# Patient Record
Sex: MALE | HISPANIC OR LATINO
[De-identification: names, ages, dates, MRNs, and addresses within clinical notes are randomized per-mention and may not be internally consistent; named-entity substitution may affect disease eponyms.]

---

## 2024-09-17 PROBLEM — Z00.00 ENCOUNTER FOR PREVENTIVE HEALTH EXAMINATION: Status: ACTIVE | Noted: 2024-09-17

## 2024-09-26 ENCOUNTER — APPOINTMENT (OUTPATIENT)
Dept: NEUROSURGERY | Facility: CLINIC | Age: 79
End: 2024-09-26
Payer: MEDICARE

## 2024-09-26 ENCOUNTER — NON-APPOINTMENT (OUTPATIENT)
Age: 79
End: 2024-09-26

## 2024-09-26 VITALS
BODY MASS INDEX: 25.69 KG/M2 | HEART RATE: 76 BPM | OXYGEN SATURATION: 95 % | HEIGHT: 63 IN | SYSTOLIC BLOOD PRESSURE: 152 MMHG | DIASTOLIC BLOOD PRESSURE: 85 MMHG | WEIGHT: 145 LBS

## 2024-09-26 DIAGNOSIS — Z86.39 PERSONAL HISTORY OF OTHER ENDOCRINE, NUTRITIONAL AND METABOLIC DISEASE: ICD-10-CM

## 2024-09-26 DIAGNOSIS — G25.0 ESSENTIAL TREMOR: ICD-10-CM

## 2024-09-26 DIAGNOSIS — Z78.9 OTHER SPECIFIED HEALTH STATUS: ICD-10-CM

## 2024-09-26 DIAGNOSIS — R25.1 TREMOR, UNSPECIFIED: ICD-10-CM

## 2024-09-26 DIAGNOSIS — Z86.79 PERSONAL HISTORY OF OTHER DISEASES OF THE CIRCULATORY SYSTEM: ICD-10-CM

## 2024-09-26 PROCEDURE — 99205 OFFICE O/P NEW HI 60 MIN: CPT

## 2024-09-26 RX ORDER — DULAGLUTIDE 1.5 MG/.5ML
1.5 INJECTION, SOLUTION SUBCUTANEOUS
Refills: 0 | Status: ACTIVE | COMMUNITY

## 2024-09-26 RX ORDER — METOPROLOL SUCCINATE 50 MG/1
50 TABLET, EXTENDED RELEASE ORAL
Refills: 0 | Status: ACTIVE | COMMUNITY

## 2024-09-26 RX ORDER — PRAVASTATIN SODIUM 20 MG/1
20 TABLET ORAL
Refills: 0 | Status: ACTIVE | COMMUNITY

## 2024-09-26 RX ORDER — EMPAGLIFLOZIN 25 MG/1
25 TABLET, FILM COATED ORAL
Refills: 0 | Status: ACTIVE | COMMUNITY

## 2024-09-26 RX ORDER — METFORMIN HYDROCHLORIDE 1000 MG/1
1000 TABLET, COATED ORAL
Refills: 0 | Status: ACTIVE | COMMUNITY

## 2024-10-01 PROBLEM — Z86.79 HISTORY OF HYPERTENSION: Status: RESOLVED | Noted: 2024-09-26 | Resolved: 2024-10-01

## 2024-10-01 PROBLEM — Z78.9 SOCIAL ALCOHOL USE: Status: ACTIVE | Noted: 2024-09-26

## 2024-10-01 PROBLEM — Z86.39 HISTORY OF DIABETES MELLITUS: Status: RESOLVED | Noted: 2024-09-26 | Resolved: 2024-10-01

## 2024-10-01 PROBLEM — R25.1 TREMOR OF RIGHT HAND: Status: RESOLVED | Noted: 2024-09-26 | Resolved: 2024-10-01

## 2024-10-01 PROBLEM — Z86.39 HISTORY OF HIGH CHOLESTEROL: Status: RESOLVED | Noted: 2024-09-26 | Resolved: 2024-10-01

## 2024-10-01 NOTE — ASSESSMENT
[FreeTextEntry1] : 78M with ET x 8 years, tremor in b/l hand R>L. Tried multiple meds including metoprolol, gabapentin and another which he couldn't tole due to side effects. Tremor is affecting quality of life and ability to perform ADLs. He would like HIFU.  Discussed MRI guided focused ultrasound therapy including the procedure, risks, and benefits in office today.   I have discussed this patients symptoms with them. his bilateral tremor significantly interferes with his quality of life. Would target the VIM thalamus/DRT tract contralateral to more bothersome tremor. It would only improve tremor in one hand with no improvement in head, jaw, or tremor in other hand.   During MRI guided focused ultrasound, an irreversible lesion is made in brain that is pathologically related to cause of tremor. If pathological track is targeted by creating lesion/burn in area along white matter tract, it will also treat tremor. Ventral intermediate nucleus of the thalamus is area that is targeted during procedure.   High intensity focused ultrasound is a procedure that occurs in an MRI magnet, where a series of MRIs are performed and the target is identified and ultrasound therapy is targeted at identified area. The head is completely shaved prior to a frame being placed. Then a rubber bag of circulating cool water is fitted to the head to prevent thermal injury to the scalp, and improve the interface between the transducer and the scalp. Subthreshold sonications are delivered so benefit and side effect can be assessed prior to creating a permanent lesion. Many series of assessments will be performed during the procedure. Lesions are irreversible. FDA approved for unilateral treatment. If this option chosen, side of the brain correlated with more bothersome symptoms, or dominant hand would be targeted. The other side can be treated 9-12 months later if the patient desires. This is procedure, not a surgery, not performed under anesthesia.   Side effects are possible including tingling around face on one side or around lips or hand. That may persist up to several months after procedure, and can persist. May be faint to severe. May attenuate over a year following procedure. Weakness possible in addition to paresthesia. Speech disturbances/dysarthria are possible. Side effects may bradford over time. Very common to have unsteady gait after procedure. You will be discharged with a walker as a fall precaution. Efficacy of tremor suppression wanes after 1 year and attenuates over time. It may return sooner in some patients. Small chance pt may not have tremor benefit from procedure. May need another procedure in the future.   This does not cure the underlying disease process.   PLAN: -Referral to movement disorder neurologist for evaluation and tremor grading. -CT head HIFU protocol to be done at [facility] to determine skull density ratio (SDR) -If SDR is favorable for treatment, pre-procedure 3T Siemens Kailyn, Research magnet, MRI brain with/without contrast, DBS protocol - Kartik for trajectory and planning  I, Dr. Joshua Verdugo, personally performed the evaluation and management (E/M) services for this new patient.  That E/M includes conducting the clinically appropriate initial history &/or exam, assessing all conditions, and establishing the plan of care.  Today, my SEYMOUR, [employer], was here to observe my evaluation and management service for this patient & follow plan of care established by me going forward.

## 2024-10-01 NOTE — ASSESSMENT
[FreeTextEntry1] : 78M with ET x 8 years, tremor in b/l hand R>L. Tried multiple meds including metoprolol, gabapentin and another which he couldn't tole due to side effects. Tremor is affecting quality of life and ability to perform ADLs. He would like HIFU.  Discussed MRI guided focused ultrasound therapy including the procedure, risks, and benefits in office today.   I have discussed this patients symptoms with them. his bilateral tremor significantly interferes with his quality of life. Would target the VIM thalamus/DRT tract contralateral to more bothersome tremor. It would only improve tremor in one hand with no improvement in head, jaw, or tremor in other hand.   During MRI guided focused ultrasound, an irreversible lesion is made in brain that is pathologically related to cause of tremor. If pathological track is targeted by creating lesion/burn in area along white matter tract, it will also treat tremor. Ventral intermediate nucleus of the thalamus is area that is targeted during procedure.   High intensity focused ultrasound is a procedure that occurs in an MRI magnet, where a series of MRIs are performed and the target is identified and ultrasound therapy is targeted at identified area. The head is completely shaved prior to a frame being placed. Then a rubber bag of circulating cool water is fitted to the head to prevent thermal injury to the scalp, and improve the interface between the transducer and the scalp. Subthreshold sonications are delivered so benefit and side effect can be assessed prior to creating a permanent lesion. Many series of assessments will be performed during the procedure. Lesions are irreversible. FDA approved for unilateral treatment. If this option chosen, side of the brain correlated with more bothersome symptoms, or dominant hand would be targeted. The other side can be treated 9-12 months later if the patient desires. This is procedure, not a surgery, not performed under anesthesia.   Side effects are possible including tingling around face on one side or around lips or hand. That may persist up to several months after procedure, and can persist. May be faint to severe. May attenuate over a year following procedure. Weakness possible in addition to paresthesia. Speech disturbances/dysarthria are possible. Side effects may bradford over time. Very common to have unsteady gait after procedure. You will be discharged with a walker as a fall precaution. Efficacy of tremor suppression wanes after 1 year and attenuates over time. It may return sooner in some patients. Small chance pt may not have tremor benefit from procedure. May need another procedure in the future.   This does not cure the underlying disease process.   PLAN: -Referral to movement disorder neurologist for evaluation and tremor grading. -CT head HIFU protocol to be done at [facility] to determine skull density ratio (SDR) -If SDR is favorable for treatment, pre-procedure 3T Siemens Kailyn, Research magnet, MRI brain with/without contrast, DBS protocol - Kartik for trajectory and planning  I, Dr. Joshua Verdugo, personally performed the evaluation and management (E/M) services for this new patient.  That E/M includes conducting the clinically appropriate initial history &/or exam, assessing all conditions, and establishing the plan of care.  Today, my SEYMOUR, [employer], was here to observe my evaluation and management service for this patient & follow plan of care established by me going forward.

## 2024-10-01 NOTE — REASON FOR VISIT
[New Patient Visit] : a new patient visit [Other: _____] : [unfilled] [Ad Hoc ] : provided by an ad hoc  [Interpreters_Relationshiptopatient] : daughter [TWNoteComboBox1] : [place]

## 2024-10-01 NOTE — HISTORY OF PRESENT ILLNESS
[de-identified] : MARIA LUISA HANSEN is a 78 year old right handed male with PMH of essential tremor, DM2 (A1c 7.4), HTN, HLD. He occasionally takes baby aspirin. He presents to the office for neurosurgical consultation for high intensity focused ultrasound. He is under the care of neurologist Dr. Pacheco ([place]). His tremor started about 8 years ago in his right hand. Now present in both hands R>L. Denies head, jaw, or voice tremor. He has tried metoprolol, gabapentin. He tried another medication he can't remember the name of but he couldn't tolerate it because he fainted in the street while taking it. Tremor is worse with stress, anxiety, and caffeine. He doesn't notice effect of alcohol on tremor. Tremor is worse with activity. No known family history of tremor.  Sometimes he has difficulty swallowing liquids. This is rare and on occasion. He is getting to the point where eating using utensils is very difficult. He can't write, drink a cup of liquids.

## 2024-10-01 NOTE — REASON FOR VISIT
[New Patient Visit] : a new patient visit [Other: _____] : [unfilled] [Ad Hoc ] : provided by an ad hoc  [Interpreters_Relationshiptopatient] : daughter [[id number]] : [place]

## 2024-10-01 NOTE — HISTORY OF PRESENT ILLNESS
[de-identified] : MARIA LUISA HANSEN is a 78 year old right handed male with PMH of essential tremor, DM2 (A1c 7.4), HTN, HLD. He occasionally takes baby aspirin. He presents to the office for neurosurgical consultation for high intensity focused ultrasound. He is under the care of neurologist Dr. Pacheco ([place]). His tremor started about 8 years ago in his right hand. Now present in both hands R>L. Denies head, jaw, or voice tremor. He has tried metoprolol, gabapentin. He tried another medication he can't remember the name of but he couldn't tolerate it because he fainted in the street while taking it. Tremor is worse with stress, anxiety, and caffeine. He doesn't notice effect of alcohol on tremor. Tremor is worse with activity. No known family history of tremor.  Sometimes he has difficulty swallowing liquids. This is rare and on occasion. He is getting to the point where eating using utensils is very difficult. He can't write, drink a cup of liquids.

## 2024-10-01 NOTE — PHYSICAL EXAM
[General Appearance - Alert] : alert [General Appearance - In No Acute Distress] : in no acute distress [Oriented To Time, Place, And Person] : oriented to person, place, and time [Affect] : the affect was normal [Mood] : the mood was normal [Cranial Nerves Oculomotor (III)] : extraocular motion intact [Cranial Nerves Trigeminal (V)] : facial sensation intact symmetrically [Cranial Nerves Facial (VII)] : face symmetrical [Cranial Nerves Accessory (XI - Cranial And Spinal)] : head turning and shoulder shrug symmetric [Cranial Nerves Hypoglossal (XII)] : there was no tongue deviation with protrusion [Motor Strength] : muscle strength was normal in all four extremities [Motor Handedness Right-Handed] : the patient is right hand dominant [Sensation Tactile Decrease] : light touch was intact [] : no respiratory distress [Respiration, Rhythm And Depth] : normal respiratory rhythm and effort [FreeTextEntry8] : no rest tremor, postural with arms extended: none, winged: 3-5 cm right,  finger to nose: 1-2 cm right, 2-3 cm left. Unable to draw spirals due to jerky hand movements when writing. Unable to tandem gait, or tandem stance. Able to stand with feet together side by side.

## 2024-10-03 ENCOUNTER — APPOINTMENT (OUTPATIENT)
Dept: CT IMAGING | Facility: CLINIC | Age: 79
End: 2024-10-03
Payer: MEDICARE

## 2024-10-03 PROCEDURE — 70450 CT HEAD/BRAIN W/O DYE: CPT

## 2024-10-10 DIAGNOSIS — I72.9 ANEURYSM OF UNSPECIFIED SITE: ICD-10-CM

## 2024-10-17 ENCOUNTER — APPOINTMENT (OUTPATIENT)
Dept: CT IMAGING | Facility: CLINIC | Age: 79
End: 2024-10-17
Payer: MEDICARE

## 2024-10-17 PROCEDURE — 70498 CT ANGIOGRAPHY NECK: CPT

## 2024-10-17 PROCEDURE — 70496 CT ANGIOGRAPHY HEAD: CPT

## 2024-10-25 ENCOUNTER — NON-APPOINTMENT (OUTPATIENT)
Age: 79
End: 2024-10-25

## 2024-10-25 ENCOUNTER — APPOINTMENT (OUTPATIENT)
Dept: NEUROSURGERY | Facility: CLINIC | Age: 79
End: 2024-10-25
Payer: MEDICARE

## 2024-10-25 VITALS
SYSTOLIC BLOOD PRESSURE: 161 MMHG | HEIGHT: 63 IN | OXYGEN SATURATION: 95 % | HEART RATE: 62 BPM | DIASTOLIC BLOOD PRESSURE: 85 MMHG | WEIGHT: 145 LBS | BODY MASS INDEX: 25.69 KG/M2

## 2024-10-25 DIAGNOSIS — I72.9 ANEURYSM OF UNSPECIFIED SITE: ICD-10-CM

## 2024-10-25 PROCEDURE — 99215 OFFICE O/P EST HI 40 MIN: CPT
